# Patient Record
Sex: MALE | Race: WHITE | NOT HISPANIC OR LATINO | Employment: FULL TIME | RURAL
[De-identification: names, ages, dates, MRNs, and addresses within clinical notes are randomized per-mention and may not be internally consistent; named-entity substitution may affect disease eponyms.]

---

## 2024-01-09 ENCOUNTER — TELEPHONE (OUTPATIENT)
Dept: FAMILY MEDICINE CLINIC | Facility: CLINIC | Age: 29
End: 2024-01-09

## 2024-01-09 NOTE — TELEPHONE ENCOUNTER
Caller: Sarah Romulo T    Relationship to patient: Self    Best call back number: [phone]*    Chief complaint: ELEVATED BLOOD PRESSURE    Type of visit: NEW PATIENT    Requested date: ASAP     Additional notes:PATIENT CALLING REQUESTING TO BE TAKEN ON AS A NEW PATIENT WITH DR. DEMETRA BARNES. THE PATIENT STATES THAT HIS MOTHER AND FATHER ARE PATIENTS OF DR. BARNES, AND THE PATIENT WAS A PATIENT WITH DR. BARNES, 12 YEARS AGO. THE PATIENT'S PARENT NAME IS MARCOS AND NIOCLE MCHUGH.  THE PATIENT STATES THAT HE VISITED THE [facility] EMERGENCY ROOM, FOR A TOOTH INFECTION, AND THE PATIENT STATES THAT HE WAS TOLD THAT HIS BLOOD PRESSURE WAS ELEVATED AND HE NEEDS TO SEE A PRIMARY CARE PHYSICIAN. THE PATIENT WOULD LIKE TO BE SCHEDULED ASAP WITH DR. BARNES, TO ADDRESS HIS BLOOD PRESSURE ISSUE.

## 2024-01-12 NOTE — TELEPHONE ENCOUNTER
I attempted to call patient with no answer. Left VM for patient to call back and schedule a new patient appointment and hypertension.

## 2024-01-15 NOTE — PROGRESS NOTES
Subjective   Romulo Smith is a 28 y.o. male.     Chief Complaint   Patient presents with    Establish Care    Hypertension       History of Present Illness  Romulo is here to establish care. He has seen Dr Sarah in the past, about 10 years ago. Ramses would like to discuss elevated blood pressure readings. He was seen at [facility] ER on 1/5/24, and his blood pressure was elevated 160/104. He has been checking his blood pressure at home with readings averaging 130s/90s.  Hypertension  This is a new problem. The current episode started 1 to 4 weeks ago. Associated symptoms include headaches. Pertinent negatives include no chest pain, palpitations or shortness of breath. There are no associated agents to hypertension. Risk factors for coronary artery disease include male gender, obesity, smoking/tobacco exposure and family history. Past treatments include nothing. Current antihypertension treatment includes nothing.            I personally reviewed and updated the patient's allergies, medications, problem list, and past medical, surgical, social, and family history. I have reviewed and confirmed the accuracy of the History of Present Illness and Review of Symptoms as documented by the MA/LPN/RN. Riccardo Sarah MD    Family History   Problem Relation Age of Onset    Hyperlipidemia Mother     Thyroid disease Mother     Heart disease Father     Hypertension Father     Thyroid disease Brother     Thyroid disease Paternal Grandmother     Hypertension Paternal Grandfather     Heart disease Paternal Grandfather        Social History     Tobacco Use    Smoking status: Every Day     Packs/day: 1.00     Years: 16.00     Additional pack years: 0.00     Total pack years: 16.00     Types: Cigarettes     Start date: 1/1/2008    Smokeless tobacco: Former     Types: Chew     Quit date: 1/1/2010   Vaping Use    Vaping Use: Never used   Substance Use Topics    Alcohol use: Not Currently    Drug use: Never       Past Surgical  "History:   Procedure Laterality Date    MOUTH SURGERY  2015       There is no problem list on file for this patient.        Current Outpatient Medications:     amoxicillin-clavulanate (AUGMENTIN) 875-125 MG per tablet, Take 1 tablet by mouth 2 (Two) Times a Day., Disp: 20 tablet, Rfl: 0    lisinopril (PRINIVIL,ZESTRIL) 20 MG tablet, Take 1 tablet by mouth Daily., Disp: 30 tablet, Rfl: 5         Review of Systems   Constitutional:  Negative for chills and diaphoresis.   HENT:  Negative for trouble swallowing and voice change.    Eyes:  Negative for visual disturbance.   Respiratory:  Negative for shortness of breath.    Cardiovascular:  Negative for chest pain and palpitations.   Gastrointestinal:  Negative for abdominal pain and nausea.   Endocrine: Negative for polydipsia and polyphagia.   Genitourinary:  Negative for hematuria.   Musculoskeletal:  Negative for neck stiffness.   Skin:  Negative for color change and pallor.   Allergic/Immunologic: Negative for immunocompromised state.   Neurological:  Negative for seizures and syncope.   Hematological:  Negative for adenopathy.   Psychiatric/Behavioral:  Negative for hallucinations, sleep disturbance and suicidal ideas.        I have reviewed and confirmed the accuracy of the ROS as documented by the MA/LPN/RN Riccardo Sarah MD      Objective   BP (!) 156/102 (BP Location: Left arm, Patient Position: Sitting, Cuff Size: Adult)   Pulse 116   Temp 98.4 °F (36.9 °C) (Temporal)   Resp 16   Ht 175.3 cm (69\")   Wt 99.2 kg (218 lb 12.8 oz)   SpO2 96%   BMI 32.31 kg/m²   BP Readings from Last 3 Encounters:   01/17/24 (!) 156/102     Wt Readings from Last 3 Encounters:   01/17/24 99.2 kg (218 lb 12.8 oz)     Physical Exam  Constitutional:       Appearance: He is well-developed. He is not diaphoretic.   HENT:      Head: Normocephalic.      Right Ear: Hearing, tympanic membrane, ear canal and external ear normal.      Left Ear: Hearing, tympanic membrane, ear canal and " external ear normal.      Nose: Nose normal. No mucosal edema or congestion.      Right Sinus: No maxillary sinus tenderness or frontal sinus tenderness.      Left Sinus: No maxillary sinus tenderness or frontal sinus tenderness.      Mouth/Throat:      Mouth: No oral lesions.      Pharynx: Uvula midline. No oropharyngeal exudate or posterior oropharyngeal erythema.      Tonsils: No tonsillar exudate.      Comments: Poor denition, dental abscess much improved, no induration or fluctuance   Eyes:      General: Lids are normal.      Conjunctiva/sclera: Conjunctivae normal.      Pupils: Pupils are equal, round, and reactive to light.   Neck:      Thyroid: No thyromegaly.      Vascular: No carotid bruit or JVD.      Trachea: No tracheal deviation.   Cardiovascular:      Rate and Rhythm: Normal rate and regular rhythm.      Heart sounds: Normal heart sounds. No murmur heard.     No friction rub. No gallop.   Pulmonary:      Effort: Pulmonary effort is normal.      Breath sounds: Normal breath sounds. No stridor. No decreased breath sounds, wheezing or rales.   Abdominal:      General: Bowel sounds are normal. There is no distension.      Palpations: Abdomen is soft. There is no mass.      Tenderness: There is no abdominal tenderness. There is no guarding or rebound.      Hernia: No hernia is present.   Lymphadenopathy:      Head:      Right side of head: No submental, submandibular, tonsillar, preauricular, posterior auricular or occipital adenopathy.      Left side of head: No submental, submandibular, tonsillar, preauricular, posterior auricular or occipital adenopathy.      Cervical: No cervical adenopathy.   Skin:     General: Skin is warm and dry.      Coloration: Skin is not pale.   Neurological:      Mental Status: He is alert and oriented to person, place, and time.      Cranial Nerves: No cranial nerve deficit.      Sensory: No sensory deficit.      Motor: No tremor, abnormal muscle tone or seizure activity.      " Coordination: Coordination normal.      Gait: Gait normal.      Deep Tendon Reflexes: Reflexes are normal and symmetric.         Data / Lab Results:    No results found for: \"HGBA1C\"     No results found for: \"LDL\", \"LDLDIRECT\"  No results found for: \"CHOL\"  No results found for: \"TRIG\"  No results found for: \"HDL\"  No results found for: \"PSA\"  No results found for: \"WBC\", \"HGB\", \"HCT\", \"MCV\", \"PLT\"  No results found for: \"TSH\", \"P4DTMLK\", \"Y7UGLMG\", \"THYROIDAB\"   No results found for: \"GLUCOSE\", \"BUN\", \"CREATININE\", \"EGFRIFNONA\", \"EGFRIFAFRI\", \"BCR\", \"K\", \"CO2\", \"CALCIUM\", \"PROTENTOTREF\", \"ALBUMIN\", \"LABIL2\", \"BILIRUBIN\", \"AST\", \"ALT\"  No results found for: \"RODERICK\", \"RF\", \"SEDRATE\"   No results found for: \"CRP\"   No results found for: \"IRON\", \"TIBC\", \"FERRITIN\"   No results found for: \"MBWCCOTS82\"       Assessment & Plan      Medications        Problem List         LOS    Hypertension.  New onset, persistent today, positive family history.  Start lisinopril.  Monitor blood pressure closely at home.  Follow-up check renal function.  Discussed low-sodium diet, tobacco cessation.  Tobacco use.  Encourage cessation.  He is working on cutting back.  Start patches.  Consider Wellbutrin/Chantix Rx if persistent symptoms.  Dental abscess.  Clinically improved today on Augmentin, continue Rx.  History of prior episode has dentist follow-up upcoming, planning to have teeth pulled.  Call return if fever or worsening symptoms.  New patient visit today, will get records.  Follow-up visit for comprehensive physical exam screening test scheduled.      Diagnoses and all orders for this visit:    1. Encounter to establish care with new doctor (Primary)    2. Primary hypertension  -     lisinopril (JIILZESTRIL) 20 MG tablet; Take 1 tablet by mouth Daily.  Dispense: 30 tablet; Refill: 5    3. Class 1 obesity due to excess calories with serious comorbidity and body mass index (BMI) of 32.0 to 32.9 in adult    4. Tobacco user    5. " Dental abscess  -     amoxicillin-clavulanate (AUGMENTIN) 875-125 MG per tablet; Take 1 tablet by mouth 2 (Two) Times a Day.  Dispense: 20 tablet; Refill: 0    BMI is >= 30 and <35. (Class 1 Obesity). The following options were offered after discussion;: exercise counseling/recommendations and nutrition counseling/recommendations            Expected course, medications, and adverse effects discussed.  Call or return if worsening or persistent symptoms.  I wore protective equipment throughout this patient encounter including a mask, gloves, and eye protection.  Hand hygiene was performed before donning protective equipment and after removal when leaving the room. The complete contents of the Assessment and Plan and Data/Lab Results as documented above have been reviewed and addressed by myself with the patient today as part of an ongoing evaluation / treatment plan.  If some of the documentation has been copied from a previous note and is unchanged it indicates that this problem / plan has been assessed today but is stable from a previous visit and no changes have been recommended.

## 2024-01-17 ENCOUNTER — OFFICE VISIT (OUTPATIENT)
Dept: FAMILY MEDICINE CLINIC | Facility: CLINIC | Age: 29
End: 2024-01-17
Payer: MEDICAID

## 2024-01-17 VITALS
HEIGHT: 69 IN | WEIGHT: 218.8 LBS | SYSTOLIC BLOOD PRESSURE: 156 MMHG | RESPIRATION RATE: 16 BRPM | HEART RATE: 116 BPM | OXYGEN SATURATION: 96 % | BODY MASS INDEX: 32.41 KG/M2 | TEMPERATURE: 98.4 F | DIASTOLIC BLOOD PRESSURE: 102 MMHG

## 2024-01-17 DIAGNOSIS — K04.7 DENTAL ABSCESS: ICD-10-CM

## 2024-01-17 DIAGNOSIS — I10 PRIMARY HYPERTENSION: ICD-10-CM

## 2024-01-17 DIAGNOSIS — Z72.0 TOBACCO USER: ICD-10-CM

## 2024-01-17 DIAGNOSIS — Z76.89 ENCOUNTER TO ESTABLISH CARE WITH NEW DOCTOR: Primary | ICD-10-CM

## 2024-01-17 DIAGNOSIS — E66.09 CLASS 1 OBESITY DUE TO EXCESS CALORIES WITH SERIOUS COMORBIDITY AND BODY MASS INDEX (BMI) OF 32.0 TO 32.9 IN ADULT: ICD-10-CM

## 2024-01-17 RX ORDER — AMOXICILLIN AND CLAVULANATE POTASSIUM 875; 125 MG/1; MG/1
1 TABLET, FILM COATED ORAL 2 TIMES DAILY
Qty: 20 TABLET | Refills: 0 | Status: SHIPPED | OUTPATIENT
Start: 2024-01-17

## 2024-01-17 RX ORDER — LISINOPRIL 20 MG/1
20 TABLET ORAL DAILY
Qty: 30 TABLET | Refills: 5 | Status: SHIPPED | OUTPATIENT
Start: 2024-01-17

## 2024-01-18 ENCOUNTER — PATIENT ROUNDING (BHMG ONLY) (OUTPATIENT)
Dept: FAMILY MEDICINE CLINIC | Facility: CLINIC | Age: 29
End: 2024-01-18
Payer: MEDICAID

## 2024-01-18 NOTE — PROGRESS NOTES
January 18, 2024    Hello, may I speak with Romulo Smith?    My name is Nadia Taylor     I am  with [facility] [address]  [facility]  [address] [place] [address] [address]  [place] [address].    Before we get started may I verify your date of birth? 1995    I am calling to officially welcome you to our practice and ask about your recent visit. Is this a good time to talk? yes    Tell me about your visit with us. What things went well?  visit went well       We're always looking for ways to make our patients' experiences even better. Do you have recommendations on ways we may improve?  no    Overall were you satisfied with your first visit to our practice? yes       I appreciate you taking the time to speak with me today. Is there anything else I can do for you? no      Thank you, and have a great day.

## 2024-04-22 ENCOUNTER — OFFICE VISIT (OUTPATIENT)
Dept: FAMILY MEDICINE CLINIC | Facility: CLINIC | Age: 29
End: 2024-04-22
Payer: MEDICAID

## 2024-04-22 VITALS
OXYGEN SATURATION: 99 % | TEMPERATURE: 98.2 F | HEART RATE: 99 BPM | BODY MASS INDEX: 31.4 KG/M2 | RESPIRATION RATE: 18 BRPM | HEIGHT: 69 IN | SYSTOLIC BLOOD PRESSURE: 122 MMHG | DIASTOLIC BLOOD PRESSURE: 80 MMHG | WEIGHT: 212 LBS

## 2024-04-22 DIAGNOSIS — R53.83 MALAISE AND FATIGUE: ICD-10-CM

## 2024-04-22 DIAGNOSIS — R53.81 MALAISE AND FATIGUE: ICD-10-CM

## 2024-04-22 DIAGNOSIS — I10 PRIMARY HYPERTENSION: ICD-10-CM

## 2024-04-22 DIAGNOSIS — Z72.0 TOBACCO USER: ICD-10-CM

## 2024-04-22 DIAGNOSIS — E66.09 CLASS 1 OBESITY DUE TO EXCESS CALORIES WITH SERIOUS COMORBIDITY AND BODY MASS INDEX (BMI) OF 32.0 TO 32.9 IN ADULT: ICD-10-CM

## 2024-04-22 DIAGNOSIS — E78.2 MIXED HYPERLIPIDEMIA: ICD-10-CM

## 2024-04-22 DIAGNOSIS — Z00.01 ENCOUNTER FOR ANNUAL GENERAL MEDICAL EXAMINATION WITH ABNORMAL FINDINGS IN ADULT: Primary | ICD-10-CM

## 2024-04-22 LAB
BILIRUB BLD-MCNC: NEGATIVE MG/DL
CLARITY, POC: CLEAR
COLOR UR: YELLOW
GLUCOSE UR STRIP-MCNC: NEGATIVE MG/DL
KETONES UR QL: NEGATIVE
LEUKOCYTE EST, POC: NEGATIVE
NITRITE UR-MCNC: NEGATIVE MG/ML
PH UR: 6 [PH] (ref 5–8)
PROT UR STRIP-MCNC: NEGATIVE MG/DL
RBC # UR STRIP: NEGATIVE /UL
SP GR UR: 1.01 (ref 1–1.03)
UROBILINOGEN UR QL: NORMAL

## 2024-04-22 PROCEDURE — 2014F MENTAL STATUS ASSESS: CPT | Performed by: FAMILY MEDICINE

## 2024-04-22 PROCEDURE — 99395 PREV VISIT EST AGE 18-39: CPT | Performed by: FAMILY MEDICINE

## 2024-04-22 PROCEDURE — 99213 OFFICE O/P EST LOW 20 MIN: CPT | Performed by: FAMILY MEDICINE

## 2024-04-22 NOTE — PROGRESS NOTES
Subjective   Romulo Smith is a 29 y.o. male.     Chief Complaint   Patient presents with    Annual Exam    Hypertension       The patient is here: to discuss health maintenance and disease prevention.  Last comprehensive physical was on unknown.  Previous physical was performed by  Riccardo Sarah MD  Overall has: moderate activity with work/home activities, good appetite, feels well with minor complaints, good energy level, and is sleeping well. Nutrition: eating a variety of foods. Last tetanus shot was  8/10/2010 .     Hypertension  This is a new problem. The current episode started more than 1 month ago. Associated symptoms include headaches. Pertinent negatives include no chest pain, palpitations or shortness of breath. There are no associated agents to hypertension. Risk factors for coronary artery disease include male gender, obesity, smoking/tobacco exposure and family history. Past treatments include nothing. Current antihypertension treatment includes nothing.        Recent Hospitalizations:  No hospitalization(s) within the last year..  ccc      I personally reviewed and updated the patient's allergies, medications, problem list, and past medical, surgical, social, and family history. I have reviewed and confirmed the accuracy of the HPI and ROS as documented by the MA/LPN/RN Riccardo Sarah MD    Family History   Problem Relation Age of Onset    Hyperlipidemia Mother     Thyroid disease Mother     Heart disease Father     Hypertension Father         Sarah    Thyroid disease Brother     Thyroid disease Paternal Grandmother     Hypertension Paternal Grandfather     Heart disease Paternal Grandfather        Social History     Tobacco Use    Smoking status: Every Day     Current packs/day: 1.00     Average packs/day: 1 pack/day for 16.3 years (16.3 ttl pk-yrs)     Types: Cigarettes     Start date: 1/1/2008     Passive exposure: Current    Smokeless tobacco: Former     Types: Chew     Quit date: 1/1/2010  "  Vaping Use    Vaping status: Never Used   Substance Use Topics    Alcohol use: Not Currently    Drug use: Never       Past Surgical History:   Procedure Laterality Date    MOUTH SURGERY  2015    TOOTH EXTRACTION      all teeth pulled.       Patient Active Problem List   Diagnosis    Mixed hyperlipidemia         Current Outpatient Medications:     lisinopril (PRINIVIL,ZESTRIL) 20 MG tablet, Take 1 tablet by mouth Daily., Disp: 30 tablet, Rfl: 5    Review of Systems   Constitutional:  Negative for chills and diaphoresis.   Eyes:  Negative for visual disturbance.   Respiratory:  Negative for shortness of breath.    Cardiovascular:  Negative for chest pain and palpitations.   Gastrointestinal:  Negative for abdominal pain and nausea.   Endocrine: Negative for polydipsia and polyphagia.   Musculoskeletal:  Negative for neck stiffness.   Skin:  Negative for color change and pallor.   Neurological:  Negative for seizures and syncope.   Hematological:  Negative for adenopathy.   Psychiatric/Behavioral:  Negative for hallucinations and suicidal ideas.        I have reviewed and confirmed the accuracy of the ROS as documented by the MA/LPN/RN Riccardo Sarah MD      Objective   /80 (BP Location: Left arm, Patient Position: Sitting, Cuff Size: Adult)   Pulse 99   Temp 98.2 °F (36.8 °C)   Resp 18   Ht 175.3 cm (69\")   Wt 96.2 kg (212 lb)   SpO2 99%   BMI 31.31 kg/m²   BP Readings from Last 3 Encounters:   04/22/24 122/80   01/17/24 (!) 156/102     Wt Readings from Last 3 Encounters:   04/22/24 96.2 kg (212 lb)   01/17/24 99.2 kg (218 lb 12.8 oz)     Physical Exam  Constitutional:       Appearance: He is well-developed. He is not diaphoretic.   HENT:      Head: Normocephalic.      Right Ear: Hearing, tympanic membrane, ear canal and external ear normal.      Left Ear: Hearing, tympanic membrane, ear canal and external ear normal.      Nose: Nose normal. No mucosal edema or congestion.      Right Sinus: No maxillary " "sinus tenderness or frontal sinus tenderness.      Left Sinus: No maxillary sinus tenderness or frontal sinus tenderness.      Mouth/Throat:      Mouth: No oral lesions.      Pharynx: Uvula midline. No oropharyngeal exudate or posterior oropharyngeal erythema.      Tonsils: No tonsillar exudate.   Eyes:      General: Lids are normal.      Conjunctiva/sclera: Conjunctivae normal.      Pupils: Pupils are equal, round, and reactive to light.   Neck:      Thyroid: No thyromegaly.      Vascular: No carotid bruit or JVD.      Trachea: No tracheal deviation.   Cardiovascular:      Rate and Rhythm: Normal rate and regular rhythm.      Heart sounds: Normal heart sounds. No murmur heard.     No friction rub. No gallop.   Pulmonary:      Effort: Pulmonary effort is normal.      Breath sounds: Normal breath sounds. No stridor. No decreased breath sounds, wheezing or rales.   Abdominal:      General: Bowel sounds are normal. There is no distension.      Palpations: Abdomen is soft. There is no mass.      Tenderness: There is no abdominal tenderness. There is no guarding or rebound.      Hernia: No hernia is present.   Lymphadenopathy:      Head:      Right side of head: No submental, submandibular, tonsillar, preauricular, posterior auricular or occipital adenopathy.      Left side of head: No submental, submandibular, tonsillar, preauricular, posterior auricular or occipital adenopathy.      Cervical: No cervical adenopathy.   Skin:     General: Skin is warm and dry.      Coloration: Skin is not pale.   Neurological:      Mental Status: He is alert and oriented to person, place, and time.      Cranial Nerves: No cranial nerve deficit.      Sensory: No sensory deficit.      Coordination: Coordination normal.      Gait: Gait normal.      Deep Tendon Reflexes: Reflexes are normal and symmetric.         Data / Lab Results:    No results found for: \"HGBA1C\"     Lab Results   Component Value Date     (H) 03/04/2024     No " "results found for: \"CHOL\"  Lab Results   Component Value Date    TRIG 124 03/04/2024     Lab Results   Component Value Date    HDL 35 (L) 03/04/2024     No results found for: \"PSA\"  Lab Results   Component Value Date    WBC 8.9 03/04/2024    HGB 16.9 03/04/2024    HCT 48.8 03/04/2024    MCV 86 03/04/2024     03/04/2024     Lab Results   Component Value Date    TSH 3.390 03/04/2024     Lab Results   Component Value Date    GLUCOSE 103 (H) 03/04/2024    BUN 15 03/04/2024    CREATININE 0.88 03/04/2024    BCR 17 03/04/2024    K 4.3 03/04/2024    CO2 22 03/04/2024    CALCIUM 9.7 03/04/2024    PROTENTOTREF 7.0 03/04/2024    ALBUMIN 4.5 03/04/2024    LABIL2 1.8 03/04/2024    AST 12 03/04/2024    ALT 24 03/04/2024     No results found for: \"RODERICK\", \"RF\", \"SEDRATE\"   No results found for: \"CRP\"   No results found for: \"IRON\", \"TIBC\", \"FERRITIN\"   No results found for: \"[id number]\"     Age-appropriate Screening Schedule:  Refer to the list below for future screening recommendations based on patient's age, sex and/or medical conditions. Orders for these recommended tests are listed in the plan section. The patient has been provided with a written plan.    Health Maintenance   Topic Date Due    Pneumococcal Vaccine 0-64 (1 of 2 - PCV) Never done    TDAP/TD VACCINES (2 - Td or Tdap) 08/10/2020    COVID-19 Vaccine (1 - 2023-24 season) Never done    HEPATITIS C SCREENING  04/22/2025 (Originally 1/9/2024)    INFLUENZA VACCINE  08/01/2024    ANNUAL PHYSICAL  04/22/2025    BMI FOLLOWUP  04/22/2025           Assessment & Plan      Medications        Problem List         LOS    Physical.  Doing well, he agrees to update his tetanus at [facility].  Discussed health maintenance, screening test, lifestyle modification.  Improved  Hypertension.  New onset, improved today on lisinopril, positive family history.  Monitor blood pressure closely at home.  renal function normal.  Discussed low-sodium diet, tobacco cessation.  Tobacco use.  " Encourage cessation.  He is working on cutting back.  Start patches.  Consider Wellbutrin/Chantix Rx if persistent symptoms.  Hyperlipidemia.  Mild elevation, .  Discussed diet, exercise and lifestyle modification.  Follow-up recheck 1 year.    Diagnoses and all orders for this visit:    1. Encounter for annual general medical examination with abnormal findings in adult (Primary)  -     POCT urinalysis dipstick, manual    2. Primary hypertension  -     CBC & Differential; Future  -     Comprehensive Metabolic Panel; Future  -     TSH; Future  -     Lipid Panel With / Chol / HDL Ratio; Future    3. Class 1 obesity due to excess calories with serious comorbidity and body mass index (BMI) of 32.0 to 32.9 in adult    4. Tobacco user    5. Malaise and fatigue  -     CBC & Differential; Future  -     Comprehensive Metabolic Panel; Future  -     TSH; Future  -     Lipid Panel With / Chol / HDL Ratio; Future    6. Mixed hyperlipidemia  Assessment & Plan:           BMI is >= 30 and <35. (Class 1 Obesity). The following options were offered after discussion;: exercise counseling/recommendations and nutrition counseling/recommendations          Expected course, medications, and adverse effects discussed.  Call or return if worsening or persistent symptoms.  I wore protective equipment throughout this patient encounter including a mask, gloves, and eye protection.  Hand hygiene was performed before donning protective equipment and after removal when leaving the room. The complete contents of the Assessment and Plan and Data / Lab Results as documented above have been reviewed and addressed by myself with the patient today as part of an ongoing evaluation / treatment plan.  If some of the documentation has been copied from a previous note and is unchanged it indicates that this problem / plan has been assessed today but is stable from a previous visit and no changes have been recommended.  The separate E/M service  provided today is significant, medically necessary, and separately identifiable.

## 2024-07-05 DIAGNOSIS — I10 PRIMARY HYPERTENSION: ICD-10-CM

## 2024-07-05 RX ORDER — LISINOPRIL 20 MG/1
20 TABLET ORAL DAILY
Qty: 30 TABLET | Refills: 0 | Status: SHIPPED | OUTPATIENT
Start: 2024-07-05

## 2024-07-16 NOTE — PROGRESS NOTES
Subjective   Romulo Smith is a 29 y.o. male.     Chief Complaint   Patient presents with    Tingling     In Left arm into Chest       Arm Pain   The incident occurred more than 1 week ago. There was no injury mechanism. The pain is present in the left forearm and left fingers. The quality of the pain is described as stabbing. The pain radiates to the chest. The pain has been Fluctuating since the incident. Associated symptoms include chest pain, numbness and tingling. Nothing aggravates the symptoms. He has tried nothing for the symptoms.   Chest Pain   This is a recurrent problem. The current episode started more than 1 month ago. The problem occurs every several days. The problem has been waxing and waning. The pain is moderate. The quality of the pain is described as pressure. Associated symptoms include a cough, dizziness, irregular heartbeat, numbness, palpitations and sputum production. Pertinent negatives include no abdominal pain, diaphoresis, headaches, nausea or shortness of breath. Associated with: laying down. Treatments tried: cough. Risk factors include smoking/tobacco exposure, male gender and obesity.   Pertinent negatives for past medical history include no seizures.   His family medical history is significant for heart disease.            I personally reviewed and updated the patient's allergies, medications, problem list, and past medical, surgical, social, and family history. I have reviewed and confirmed the accuracy of the History of Present Illness and Review of Symptoms as documented by the MA/SUSAN/RN. Riccardo Sarah MD    Family History   Problem Relation Age of Onset    Hyperlipidemia Mother     Thyroid disease Mother     Heart disease Father     Hypertension Father         Saarh    Thyroid disease Brother     Thyroid disease Paternal Grandmother     Hypertension Paternal Grandfather     Heart disease Paternal Grandfather        Social History     Tobacco Use    Smoking status: Every  "Day     Current packs/day: 1.00     Average packs/day: 1 pack/day for 16.6 years (16.6 ttl pk-yrs)     Types: Cigarettes     Start date: 1/1/2008     Passive exposure: Current    Smokeless tobacco: Former     Types: Chew     Quit date: 1/1/2010   Vaping Use    Vaping status: Never Used   Substance Use Topics    Alcohol use: Not Currently    Drug use: Never       Past Surgical History:   Procedure Laterality Date    MOUTH SURGERY  2015    TOOTH EXTRACTION      all teeth pulled.       Patient Active Problem List   Diagnosis    Mixed hyperlipidemia         Current Outpatient Medications:     lisinopril (PRINIVIL,ZESTRIL) 20 MG tablet, Take 1 tablet by mouth once daily, Disp: 30 tablet, Rfl: 0    omeprazole (priLOSEC) 40 MG capsule, Take 1 capsule by mouth Daily., Disp: 90 capsule, Rfl: 3         Review of Systems   Constitutional:  Negative for chills and diaphoresis.   Eyes:  Negative for visual disturbance.   Respiratory:  Positive for cough and sputum production. Negative for shortness of breath.    Cardiovascular:  Positive for chest pain and palpitations.   Gastrointestinal:  Negative for abdominal pain and nausea.   Endocrine: Negative for polydipsia and polyphagia.   Musculoskeletal:  Negative for neck stiffness.   Skin:  Negative for color change and pallor.   Neurological:  Positive for dizziness, tingling and numbness. Negative for seizures and syncope.   Hematological:  Negative for adenopathy.   Psychiatric/Behavioral:  Negative for hallucinations and suicidal ideas.    All other systems reviewed and are negative.      I have reviewed and confirmed the accuracy of the ROS as documented by the MA/LPN/RN Riccardo Sarah MD      Objective   /74 (BP Location: Right arm, Patient Position: Sitting, Cuff Size: Adult)   Pulse 87   Temp 98.4 °F (36.9 °C) (Temporal)   Resp 18   Ht 175.3 cm (69.02\")   Wt 98.9 kg (218 lb)   SpO2 97%   BMI 32.18 kg/m²   BP Readings from Last 3 Encounters:   07/17/24 118/74 "   04/22/24 122/80   01/17/24 (!) 156/102     Wt Readings from Last 3 Encounters:   07/17/24 98.9 kg (218 lb)   04/22/24 96.2 kg (212 lb)   01/17/24 99.2 kg (218 lb 12.8 oz)     Physical Exam  Constitutional:       Appearance: He is well-developed. He is not diaphoretic.   HENT:      Head: Normocephalic.      Right Ear: Tympanic membrane, ear canal and external ear normal.      Left Ear: Tympanic membrane, ear canal and external ear normal.      Nose: Nose normal.   Eyes:      General: Lids are normal.      Conjunctiva/sclera: Conjunctivae normal.      Pupils: Pupils are equal, round, and reactive to light.   Neck:      Thyroid: No thyromegaly.      Vascular: No carotid bruit or JVD.      Trachea: No tracheal deviation.   Cardiovascular:      Rate and Rhythm: Normal rate and regular rhythm.      Heart sounds: Normal heart sounds. No murmur heard.     No friction rub. No gallop.   Pulmonary:      Effort: Pulmonary effort is normal.      Breath sounds: Normal breath sounds. No stridor. No decreased breath sounds, wheezing or rales.   Abdominal:      General: Bowel sounds are normal. There is no distension.      Palpations: Abdomen is soft. There is no mass.      Tenderness: There is no abdominal tenderness. There is no guarding or rebound.      Hernia: No hernia is present.   Lymphadenopathy:      Head:      Right side of head: No submental, submandibular, tonsillar, preauricular, posterior auricular or occipital adenopathy.      Left side of head: No submental, submandibular, tonsillar, preauricular, posterior auricular or occipital adenopathy.      Cervical: No cervical adenopathy.   Skin:     General: Skin is warm and dry.      Coloration: Skin is not pale.   Neurological:      Mental Status: He is alert and oriented to person, place, and time.      Cranial Nerves: No cranial nerve deficit.      Sensory: No sensory deficit.      Coordination: Coordination normal.      Gait: Gait normal.      Deep Tendon Reflexes:  "Reflexes are normal and symmetric.         Data / Lab Results:    No results found for: \"HGBA1C\"     Lab Results   Component Value Date     (H) 03/04/2024     No results found for: \"CHOL\"  Lab Results   Component Value Date    TRIG 124 03/04/2024     Lab Results   Component Value Date    HDL 35 (L) 03/04/2024     No results found for: \"PSA\"  Lab Results   Component Value Date    WBC 8.9 03/04/2024    HGB 16.9 03/04/2024    HCT 48.8 03/04/2024    MCV 86 03/04/2024     03/04/2024     Lab Results   Component Value Date    TSH 3.390 03/04/2024      Lab Results   Component Value Date    GLUCOSE 103 (H) 03/04/2024    BUN 15 03/04/2024    CREATININE 0.88 03/04/2024    BCR 17 03/04/2024    K 4.3 03/04/2024    CO2 22 03/04/2024    CALCIUM 9.7 03/04/2024    PROTENTOTREF 7.0 03/04/2024    ALBUMIN 4.5 03/04/2024    LABIL2 1.8 03/04/2024    AST 12 03/04/2024    ALT 24 03/04/2024     No results found for: \"RODERICK\", \"RF\", \"SEDRATE\"   No results found for: \"CRP\"   No results found for: \"IRON\", \"TIBC\", \"FERRITIN\"   No results found for: \"[id number]\"     Procedure note: EKG today with normal sinus rhythm, positive for left anterior fascicular block/left axis deviation stable from prior EKG 7-5-2010, no significant ST or T wave changes    Assessment & Plan      Medications        Problem List         LOS    Chest pain.  Atypical, likely secondary to GERD, start PPI.  Stop smoking.  EKG benign today, check stat troponin.  Positive multiple cardiac risk factors.  Continue ASA.  Follow-up recheck.  Consider stress testing if persistent symptoms.  Call return if chest pain on exertion or worsening symptoms.  Health maintenance.  Doing well, he agrees to update his tetanus at [facility].  Discussed health maintenance, screening test, lifestyle modification.  Improved  Hypertension.  New onset, improved today on lisinopril, positive family history.  Monitor blood pressure closely at home.  renal function normal.  Discussed " low-sodium diet, tobacco cessation.  Tobacco use.  Encourage cessation.  He is working on cutting back.  Start patches.  Consider Wellbutrin/Chantix Rx if persistent symptoms.  Hyperlipidemia.  Mild elevation, .  Discussed diet, exercise and lifestyle modification.  Follow-up recheck 1 year.        Diagnoses and all orders for this visit:    1. Other chest pain (Primary)  -     ECG 12 Lead  -     CBC & Differential  -     Basic Metabolic Panel  -     Troponin T    2. Numbness and tingling in left arm    3. Class 1 obesity due to excess calories with serious comorbidity and body mass index (BMI) of 32.0 to 32.9 in adult    4. Tobacco user    5. Mixed hyperlipidemia    6. Gastroesophageal reflux disease, unspecified whether esophagitis present  -     omeprazole (priLOSEC) 40 MG capsule; Take 1 capsule by mouth Daily.  Dispense: 90 capsule; Refill: 3        BMI is >= 30 and <35. (Class 1 Obesity). The following options were offered after discussion;: exercise counseling/recommendations and nutrition counseling/recommendations        Expected course, medications, and adverse effects discussed.  Call or return if worsening or persistent symptoms.  I wore protective equipment throughout this patient encounter including a mask, gloves, and eye protection.  Hand hygiene was performed before donning protective equipment and after removal when leaving the room. The complete contents of the Assessment and Plan and Data/Lab Results as documented above have been reviewed and addressed by myself with the patient today as part of an ongoing evaluation / treatment plan.  If some of the documentation has been copied from a previous note and is unchanged it indicates that this problem / plan has been assessed today but is stable from a previous visit and no changes have been recommended.

## 2024-07-17 ENCOUNTER — OFFICE VISIT (OUTPATIENT)
Dept: FAMILY MEDICINE CLINIC | Facility: CLINIC | Age: 29
End: 2024-07-17
Payer: MEDICAID

## 2024-07-17 VITALS
SYSTOLIC BLOOD PRESSURE: 118 MMHG | HEIGHT: 69 IN | WEIGHT: 218 LBS | DIASTOLIC BLOOD PRESSURE: 74 MMHG | RESPIRATION RATE: 18 BRPM | OXYGEN SATURATION: 97 % | BODY MASS INDEX: 32.29 KG/M2 | TEMPERATURE: 98.4 F | HEART RATE: 87 BPM

## 2024-07-17 DIAGNOSIS — E66.09 CLASS 1 OBESITY DUE TO EXCESS CALORIES WITH SERIOUS COMORBIDITY AND BODY MASS INDEX (BMI) OF 32.0 TO 32.9 IN ADULT: ICD-10-CM

## 2024-07-17 DIAGNOSIS — E78.2 MIXED HYPERLIPIDEMIA: ICD-10-CM

## 2024-07-17 DIAGNOSIS — R20.0 NUMBNESS AND TINGLING IN LEFT ARM: ICD-10-CM

## 2024-07-17 DIAGNOSIS — K21.9 GASTROESOPHAGEAL REFLUX DISEASE, UNSPECIFIED WHETHER ESOPHAGITIS PRESENT: ICD-10-CM

## 2024-07-17 DIAGNOSIS — R07.89 OTHER CHEST PAIN: Primary | ICD-10-CM

## 2024-07-17 DIAGNOSIS — Z72.0 TOBACCO USER: ICD-10-CM

## 2024-07-17 DIAGNOSIS — R20.2 NUMBNESS AND TINGLING IN LEFT ARM: ICD-10-CM

## 2024-07-17 PROCEDURE — 1126F AMNT PAIN NOTED NONE PRSNT: CPT | Performed by: FAMILY MEDICINE

## 2024-07-17 PROCEDURE — 99214 OFFICE O/P EST MOD 30 MIN: CPT | Performed by: FAMILY MEDICINE

## 2024-07-17 PROCEDURE — 93000 ELECTROCARDIOGRAM COMPLETE: CPT | Performed by: FAMILY MEDICINE

## 2024-07-17 RX ORDER — OMEPRAZOLE 40 MG/1
40 CAPSULE, DELAYED RELEASE ORAL DAILY
Qty: 90 CAPSULE | Refills: 3 | Status: SHIPPED | OUTPATIENT
Start: 2024-07-17

## 2024-07-18 ENCOUNTER — TELEPHONE (OUTPATIENT)
Dept: FAMILY MEDICINE CLINIC | Facility: CLINIC | Age: 29
End: 2024-07-18
Payer: MEDICAID

## 2024-07-18 NOTE — TELEPHONE ENCOUNTER
"  Caller: Romulo Smith \"Ramses\"    Relationship: Self    Best call back number: [phone] (Mobile)     Caller requesting test results:     What test was performed: LABS     When was the test performed: YESTERDAY    Where was the test performed:     Additional notes:   "

## 2024-07-19 NOTE — TELEPHONE ENCOUNTER
I called and spoke with Oleg. Apologized for delay and let him know that per dr robbins blood work looks good. Troponin was negative, He advises to take the acid blocker and keep followup as planned. Patient voiced understanding and states that he has been taking the acid blocker and is feeling better. Advised to keep follow up appt

## 2024-08-10 DIAGNOSIS — I10 PRIMARY HYPERTENSION: ICD-10-CM

## 2024-08-12 RX ORDER — LISINOPRIL 20 MG/1
20 TABLET ORAL DAILY
Qty: 30 TABLET | Refills: 0 | Status: SHIPPED | OUTPATIENT
Start: 2024-08-12

## 2024-09-11 DIAGNOSIS — I10 PRIMARY HYPERTENSION: ICD-10-CM

## 2024-09-11 RX ORDER — LISINOPRIL 20 MG/1
20 TABLET ORAL DAILY
Qty: 30 TABLET | Refills: 1 | Status: SHIPPED | OUTPATIENT
Start: 2024-09-11

## 2024-09-25 ENCOUNTER — OFFICE VISIT (OUTPATIENT)
Dept: FAMILY MEDICINE CLINIC | Facility: CLINIC | Age: 29
End: 2024-09-25
Payer: MEDICAID

## 2024-09-25 VITALS
SYSTOLIC BLOOD PRESSURE: 128 MMHG | OXYGEN SATURATION: 97 % | TEMPERATURE: 98.4 F | BODY MASS INDEX: 33.44 KG/M2 | HEIGHT: 69 IN | DIASTOLIC BLOOD PRESSURE: 80 MMHG | WEIGHT: 225.8 LBS | HEART RATE: 106 BPM | RESPIRATION RATE: 18 BRPM

## 2024-09-25 DIAGNOSIS — K21.9 GASTROESOPHAGEAL REFLUX DISEASE WITHOUT ESOPHAGITIS: ICD-10-CM

## 2024-09-25 DIAGNOSIS — Z72.0 TOBACCO USER: ICD-10-CM

## 2024-09-25 DIAGNOSIS — E66.09 CLASS 1 OBESITY DUE TO EXCESS CALORIES WITH SERIOUS COMORBIDITY AND BODY MASS INDEX (BMI) OF 32.0 TO 32.9 IN ADULT: ICD-10-CM

## 2024-09-25 DIAGNOSIS — I10 ESSENTIAL HYPERTENSION: ICD-10-CM

## 2024-09-25 DIAGNOSIS — R07.89 OTHER CHEST PAIN: Primary | ICD-10-CM

## 2024-09-25 PROCEDURE — 3074F SYST BP LT 130 MM HG: CPT | Performed by: FAMILY MEDICINE

## 2024-09-25 PROCEDURE — 1126F AMNT PAIN NOTED NONE PRSNT: CPT | Performed by: FAMILY MEDICINE

## 2024-09-25 PROCEDURE — 99214 OFFICE O/P EST MOD 30 MIN: CPT | Performed by: FAMILY MEDICINE

## 2024-09-25 PROCEDURE — 3079F DIAST BP 80-89 MM HG: CPT | Performed by: FAMILY MEDICINE

## 2024-10-14 ENCOUNTER — TELEPHONE (OUTPATIENT)
Dept: FAMILY MEDICINE CLINIC | Facility: CLINIC | Age: 29
End: 2024-10-14
Payer: MEDICAID

## 2024-11-15 DIAGNOSIS — I10 PRIMARY HYPERTENSION: ICD-10-CM

## 2024-11-15 RX ORDER — LISINOPRIL 20 MG/1
20 TABLET ORAL DAILY
Qty: 30 TABLET | Refills: 0 | Status: SHIPPED | OUTPATIENT
Start: 2024-11-15 | End: 2024-11-18

## 2024-11-16 DIAGNOSIS — I10 PRIMARY HYPERTENSION: ICD-10-CM

## 2024-11-18 RX ORDER — LISINOPRIL 20 MG/1
20 TABLET ORAL DAILY
Qty: 30 TABLET | Refills: 0 | Status: SHIPPED | OUTPATIENT
Start: 2024-11-18

## 2025-01-08 DIAGNOSIS — I10 PRIMARY HYPERTENSION: ICD-10-CM

## 2025-01-08 RX ORDER — LISINOPRIL 20 MG/1
20 TABLET ORAL DAILY
Qty: 30 TABLET | Refills: 0 | Status: SHIPPED | OUTPATIENT
Start: 2025-01-08

## 2025-02-07 DIAGNOSIS — I10 PRIMARY HYPERTENSION: ICD-10-CM

## 2025-02-07 RX ORDER — LISINOPRIL 20 MG/1
20 TABLET ORAL DAILY
Qty: 30 TABLET | Refills: 0 | Status: SHIPPED | OUTPATIENT
Start: 2025-02-07

## 2025-04-02 DIAGNOSIS — I10 PRIMARY HYPERTENSION: ICD-10-CM

## 2025-04-02 RX ORDER — LISINOPRIL 20 MG/1
20 TABLET ORAL DAILY
Qty: 30 TABLET | Refills: 0 | Status: SHIPPED | OUTPATIENT
Start: 2025-04-02

## 2025-06-06 DIAGNOSIS — I10 PRIMARY HYPERTENSION: ICD-10-CM

## 2025-06-06 RX ORDER — LISINOPRIL 20 MG/1
20 TABLET ORAL DAILY
Qty: 30 TABLET | Refills: 0 | OUTPATIENT
Start: 2025-06-06

## 2025-08-21 DIAGNOSIS — K21.9 GASTROESOPHAGEAL REFLUX DISEASE, UNSPECIFIED WHETHER ESOPHAGITIS PRESENT: ICD-10-CM

## 2025-08-22 RX ORDER — OMEPRAZOLE 40 MG/1
40 CAPSULE, DELAYED RELEASE ORAL DAILY
Qty: 90 CAPSULE | Refills: 0 | Status: SHIPPED | OUTPATIENT
Start: 2025-08-22

## 2025-08-26 ENCOUNTER — OFFICE VISIT (OUTPATIENT)
Dept: FAMILY MEDICINE CLINIC | Facility: CLINIC | Age: 30
End: 2025-08-26

## 2025-08-26 VITALS
SYSTOLIC BLOOD PRESSURE: 146 MMHG | OXYGEN SATURATION: 98 % | HEART RATE: 101 BPM | TEMPERATURE: 98.4 F | BODY MASS INDEX: 35.01 KG/M2 | HEIGHT: 69 IN | WEIGHT: 236.4 LBS | DIASTOLIC BLOOD PRESSURE: 88 MMHG | RESPIRATION RATE: 18 BRPM

## 2025-08-26 DIAGNOSIS — E66.09 CLASS 1 OBESITY DUE TO EXCESS CALORIES WITH SERIOUS COMORBIDITY AND BODY MASS INDEX (BMI) OF 32.0 TO 32.9 IN ADULT: ICD-10-CM

## 2025-08-26 DIAGNOSIS — K21.9 GASTROESOPHAGEAL REFLUX DISEASE, UNSPECIFIED WHETHER ESOPHAGITIS PRESENT: ICD-10-CM

## 2025-08-26 DIAGNOSIS — E78.2 MIXED HYPERLIPIDEMIA: ICD-10-CM

## 2025-08-26 DIAGNOSIS — I10 PRIMARY HYPERTENSION: Primary | ICD-10-CM

## 2025-08-26 DIAGNOSIS — Z72.0 TOBACCO USER: ICD-10-CM

## 2025-08-26 DIAGNOSIS — R53.83 MALAISE AND FATIGUE: ICD-10-CM

## 2025-08-26 DIAGNOSIS — B35.6 TINEA CRURIS: ICD-10-CM

## 2025-08-26 DIAGNOSIS — E66.811 CLASS 1 OBESITY DUE TO EXCESS CALORIES WITH SERIOUS COMORBIDITY AND BODY MASS INDEX (BMI) OF 32.0 TO 32.9 IN ADULT: ICD-10-CM

## 2025-08-26 DIAGNOSIS — Z00.01 ENCOUNTER FOR ANNUAL GENERAL MEDICAL EXAMINATION WITH ABNORMAL FINDINGS IN ADULT: ICD-10-CM

## 2025-08-26 DIAGNOSIS — R21 RASH: ICD-10-CM

## 2025-08-26 DIAGNOSIS — R53.81 MALAISE AND FATIGUE: ICD-10-CM

## 2025-08-26 RX ORDER — OMEPRAZOLE 40 MG/1
40 CAPSULE, DELAYED RELEASE ORAL DAILY
Qty: 90 CAPSULE | Refills: 3 | Status: SHIPPED | OUTPATIENT
Start: 2025-08-26

## 2025-08-26 RX ORDER — CLOTRIMAZOLE 1 %
1 CREAM (GRAM) TOPICAL 2 TIMES DAILY
Qty: 60 G | Refills: 5 | Status: SHIPPED | OUTPATIENT
Start: 2025-08-26

## 2025-08-26 RX ORDER — LISINOPRIL 20 MG/1
20 TABLET ORAL DAILY
Qty: 90 TABLET | Refills: 3 | Status: SHIPPED | OUTPATIENT
Start: 2025-08-26